# Patient Record
Sex: FEMALE | Race: OTHER | Employment: UNEMPLOYED | URBAN - METROPOLITAN AREA
[De-identification: names, ages, dates, MRNs, and addresses within clinical notes are randomized per-mention and may not be internally consistent; named-entity substitution may affect disease eponyms.]

---

## 2021-04-16 ENCOUNTER — HOSPITAL ENCOUNTER (OUTPATIENT)
Dept: GENERAL RADIOLOGY | Age: 61
Discharge: HOME | End: 2021-04-16
Payer: COMMERCIAL

## 2021-04-16 DIAGNOSIS — J45.909 MILD ASTHMA: ICD-10-CM

## 2021-04-16 PROCEDURE — 71046 X-RAY EXAM CHEST 2 VIEWS: CPT

## 2021-05-14 ENCOUNTER — TELEPHONE (OUTPATIENT)
Dept: CARDIOLOGY CLINIC | Age: 61
End: 2021-05-14

## 2021-05-25 ENCOUNTER — OFFICE VISIT (OUTPATIENT)
Dept: CARDIOLOGY CLINIC | Age: 61
End: 2021-05-25
Payer: COMMERCIAL

## 2021-05-25 VITALS
DIASTOLIC BLOOD PRESSURE: 80 MMHG | HEIGHT: 62 IN | BODY MASS INDEX: 29.63 KG/M2 | OXYGEN SATURATION: 97 % | WEIGHT: 161 LBS | SYSTOLIC BLOOD PRESSURE: 118 MMHG | HEART RATE: 91 BPM

## 2021-05-25 DIAGNOSIS — R06.09 DOE (DYSPNEA ON EXERTION): ICD-10-CM

## 2021-05-25 DIAGNOSIS — R07.9 CHEST PAIN, UNSPECIFIED TYPE: Primary | ICD-10-CM

## 2021-05-25 PROCEDURE — 99204 OFFICE O/P NEW MOD 45 MIN: CPT | Performed by: INTERNAL MEDICINE

## 2021-05-25 PROCEDURE — 93000 ELECTROCARDIOGRAM COMPLETE: CPT | Performed by: INTERNAL MEDICINE

## 2021-05-25 RX ORDER — ALBUTEROL SULFATE 0.63 MG/3ML
0.63 SOLUTION RESPIRATORY (INHALATION)
COMMUNITY

## 2021-05-25 RX ORDER — GUAIFENESIN 100 MG/5ML
LIQUID (ML) ORAL
COMMUNITY
Start: 2021-04-30

## 2021-05-25 RX ORDER — CHOLECALCIFEROL (VITAMIN D3) 125 MCG
CAPSULE ORAL
COMMUNITY

## 2021-05-25 RX ORDER — ZINC GLUCONATE 10 MG
LOZENGE ORAL
COMMUNITY

## 2021-05-25 RX ORDER — ASCORBIC ACID 500 MG
TABLET ORAL
COMMUNITY
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
## 2021-05-25 NOTE — PROGRESS NOTES
Joya Moon presents today for   Chief Complaint   Patient presents with    New Patient     ref by PCP for HEIN and chest pain       Joya Moon preferred language for health care discussion is english/other. Is someone accompanying this pt? yes    Is the patient using any DME equipment during [address]? no    Depression Screening:  3 most recent PHQ Screens 5/25/2021   Little interest or pleasure in doing things Not at all   Feeling down, depressed, irritable, or hopeless Not at all   Total Score PHQ 2 0       Learning Assessment:  Learning Assessment 5/25/2021   PRIMARY LEARNER Patient   BARRIERS PRIMARY LEARNER [address] CAREGIVER Yes   PRIMARY LANGUAGE ENGLISH   PRIMARY LANGUAGE CO-LEARNER [place]   LEARNER PREFERENCE PRIMARY DEMONSTRATION   ANSWERED BY patient   RELATIONSHIP SELF       Abuse Screening:  Abuse Screening Questionnaire 5/25/2021   Do you ever feel afraid of your partner? N   Are you in a relationship with someone who physically or mentally threatens you? N   Is it safe for you to go home? Y       Fall Risk  No flowsheet data found. Pt currently taking Anticoagulant therapy? no    Coordination of Care:  1. Have you been to the ER, urgent care clinic since your last visit? Hospitalized since your last visit? no    2. Have you seen or consulted any other health care providers outside of the [address] [place] [address] since your last visit? Include any pap smears or colon screening.  no

## 2021-07-08 ENCOUNTER — TELEPHONE (OUTPATIENT)
Dept: CARDIOLOGY CLINIC | Age: 61
End: 2021-07-08

## 2021-07-08 NOTE — TELEPHONE ENCOUNTER
----- Message from [id number] Faisal Perez MD sent at 7/7/2021 10:38 AM EDT -----  Please let the patient know that her echocardiogram is normal.  ----- Message -----  From: Lena Villagran  Sent: 7/2/2021  11:03 AM EDT  To: [id number] Faisal Perez MD    Per your last  note\" From cardiac standpoint, her left upper chest and shoulder discomfort is somewhat atypical.  However, she does have risk factors as well as abnormality showing T wave inversions in leads III, V3-V4. With that in mind, I have requested exercise nuclear scan as well as an echocardiogram.  Her blood pressure is well controlled. Her heart rate is acceptable. There is no evidence of decompensated CHF noted; however, she does have orthopnea and requires at least 2 pillows, occasionally 3. An echocardiogram has been requested to assess her left ventricular systolic function and valvular integrity. Her weight today is 161 pounds. Her target LDL is less than 70. She was put on Lipitor in the past by her PCP. However, she could not tolerate and self discontinued. She remains on baby aspirin daily.

## 2021-07-08 NOTE — TELEPHONE ENCOUNTER
----- Message from [id number] Faisal Perez MD sent at 6/29/2021  3:25 PM EDT -----  Please let the patient know that her nuclear scan is unremarkable.  ----- Message -----  From: Cynthia Lopez  Sent: 6/29/2021   3:05 PM EDT  To: [id number] Faisal Perez MD    Per your last note\"  From cardiac standpoint, her left upper chest and shoulder discomfort is somewhat atypical.  However, she does have risk factors as well as abnormality showing T wave inversions in leads III, V3-V4. With that in mind, I have requested exercise nuclear scan as well as an echocardiogram.  Her blood pressure is well controlled. Her heart rate is acceptable. There is no evidence of decompensated CHF noted; however, she does have orthopnea and requires at least 2 pillows, occasionally 3. An echocardiogram has been requested to assess her left ventricular systolic function and valvular integrity. Her weight today is 161 pounds. Her target LDL is less than 70. She was put on Lipitor in the past by her PCP. However, she could not tolerate and self discontinued. She remains on baby aspirin daily. If the nuclear scan or echocardiogram are significantly abnormal, she will likely require further evaluation including possibility of coronary angiography. This was discussed at length with the patient and her  who accompanied her. All questions were answered.

## 2021-12-10 ENCOUNTER — TRANSCRIBE ORDER (OUTPATIENT)
Dept: SCHEDULING | Age: 61
End: 2021-12-10

## 2021-12-10 DIAGNOSIS — Z12.31 VISIT FOR SCREENING MAMMOGRAM: Primary | ICD-10-CM

## 2021-12-21 ENCOUNTER — OFFICE VISIT (OUTPATIENT)
Dept: CARDIOLOGY CLINIC | Age: 61
End: 2021-12-21
Payer: COMMERCIAL

## 2021-12-21 VITALS
DIASTOLIC BLOOD PRESSURE: 86 MMHG | BODY MASS INDEX: 29.26 KG/M2 | WEIGHT: 159 LBS | HEART RATE: 87 BPM | HEIGHT: 62 IN | SYSTOLIC BLOOD PRESSURE: 130 MMHG | OXYGEN SATURATION: 96 %

## 2021-12-21 DIAGNOSIS — R00.2 PALPITATIONS: ICD-10-CM

## 2021-12-21 DIAGNOSIS — R07.9 CHEST PAIN, UNSPECIFIED TYPE: Primary | ICD-10-CM

## 2021-12-21 DIAGNOSIS — R60.9 EDEMA, UNSPECIFIED TYPE: ICD-10-CM

## 2021-12-21 DIAGNOSIS — R06.09 DOE (DYSPNEA ON EXERTION): ICD-10-CM

## 2021-12-21 DIAGNOSIS — I82.4Y9 DEEP VEIN THROMBOSIS (DVT) OF PROXIMAL LOWER EXTREMITY, UNSPECIFIED CHRONICITY, UNSPECIFIED LATERALITY (HCC): ICD-10-CM

## 2021-12-21 PROCEDURE — 99214 OFFICE O/P EST MOD 30 MIN: CPT | Performed by: INTERNAL MEDICINE

## 2021-12-21 PROCEDURE — 93000 ELECTROCARDIOGRAM COMPLETE: CPT | Performed by: INTERNAL MEDICINE

## 2021-12-21 NOTE — PATIENT INSTRUCTIONS
If you have not heard from the central scheduler to schedule your testing in 48 hours, please call [phone].
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
## 2021-12-21 NOTE — PROGRESS NOTES
Vallery Ormond presents today for   Chief Complaint   Patient presents with    Follow-up     6 month follow up        [place] PedroOrmond preferred language for health care discussion is english/other. Is someone accompanying this pt? yes    Is the patient using any DME equipment during [address]? no    Depression Screening:  3 most recent PHQ Screens 12/21/2021   Little interest or pleasure in doing things Not at all   Feeling down, depressed, irritable, or hopeless Not at all   Total Score PHQ 2 0       Learning Assessment:  Learning Assessment 5/25/2021   PRIMARY LEARNER Patient   BARRIERS PRIMARY LEARNER [address] CAREGIVER Yes   PRIMARY LANGUAGE ENGLISH   PRIMARY LANGUAGE [address] [employer]   LEARNER PREFERENCE PRIMARY DEMONSTRATION   ANSWERED BY patient   RELATIONSHIP SELF       Abuse Screening:  Abuse Screening Questionnaire 12/21/2021   Do you ever feel afraid of your partner? N   Are you in a relationship with someone who physically or mentally threatens you? N   Is it safe for you to go home? Y       Fall Risk  No flowsheet data found. Pt currently taking Anticoagulant therapy? no    Coordination of Care:  1. Have you been to the ER, urgent care clinic since your last visit? Hospitalized since your last visit? no    2. Have you seen or consulted any other health care providers outside of the [address] [place] [address] since your last visit? Include any pap smears or colon screening.  no    yes

## 2022-01-05 ENCOUNTER — HOSPITAL ENCOUNTER (OUTPATIENT)
Dept: VASCULAR SURGERY | Age: 62
Discharge: HOME | End: 2022-01-05
Attending: INTERNAL MEDICINE
Payer: COMMERCIAL

## 2022-01-05 ENCOUNTER — HOSPITAL ENCOUNTER (OUTPATIENT)
Dept: MAMMOGRAPHY | Age: 62
Discharge: HOME | End: 2022-01-05
Attending: PHYSICIAN ASSISTANT
Payer: COMMERCIAL

## 2022-01-05 DIAGNOSIS — Z12.31 VISIT FOR SCREENING MAMMOGRAM: ICD-10-CM

## 2022-01-05 DIAGNOSIS — R60.9 EDEMA, UNSPECIFIED TYPE: ICD-10-CM

## 2022-01-05 PROCEDURE — 93970 EXTREMITY STUDY: CPT

## 2022-01-05 PROCEDURE — 77067 SCR MAMMO BI INCL CAD: CPT
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
## 2022-01-25 ENCOUNTER — TELEPHONE (OUTPATIENT)
Dept: CARDIOLOGY CLINIC | Age: 62
End: 2022-01-25

## 2022-01-25 NOTE — LETTER
2/2/2022 2:27 PM    Ms. Claudia Hampton  [id number]        We have been trying to get in contact with you to go over your results. Please give our office a call to go over your results and any recommendations.       Sincerely,        Grisel Anthony MD
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
## 2022-04-05 ENCOUNTER — OFFICE VISIT (OUTPATIENT)
Dept: CARDIOLOGY CLINIC | Age: 62
End: 2022-04-05
Payer: COMMERCIAL

## 2022-04-05 VITALS
DIASTOLIC BLOOD PRESSURE: 82 MMHG | HEIGHT: 62 IN | WEIGHT: 163 LBS | SYSTOLIC BLOOD PRESSURE: 122 MMHG | BODY MASS INDEX: 30 KG/M2 | OXYGEN SATURATION: 98 %

## 2022-04-05 DIAGNOSIS — R07.9 CHEST PAIN, UNSPECIFIED TYPE: Primary | ICD-10-CM

## 2022-04-05 DIAGNOSIS — R06.09 DOE (DYSPNEA ON EXERTION): ICD-10-CM

## 2022-04-05 PROCEDURE — 99214 OFFICE O/P EST MOD 30 MIN: CPT | Performed by: INTERNAL MEDICINE

## 2022-04-05 PROCEDURE — 93000 ELECTROCARDIOGRAM COMPLETE: CPT | Performed by: INTERNAL MEDICINE
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
## 2022-04-05 NOTE — PROGRESS NOTES
Chuy Giang presents today for   Chief Complaint   Patient presents with    Follow-up     3 month follow up     Dizziness     occasionally        Chuy Giang preferred language for health care discussion is english/other. Is someone accompanying this pt? yes    Is the patient using any DME equipment during OV? No     Depression Screening:  3 most recent PHQ Screens 4/5/2022   Little interest or pleasure in doing things Not at all   Feeling down, depressed, irritable, or hopeless Not at all   Total Score PHQ 2 0       Learning Assessment:  Learning Assessment 5/25/2021   PRIMARY LEARNER Patient   BARRIERS PRIMARY LEARNER [address] CAREGIVER Yes   PRIMARY LANGUAGE ENGLISH   PRIMARY LANGUAGE CO-LEARNER [place] Island[place]   LEARNER PREFERENCE PRIMARY DEMONSTRATION   ANSWERED BY patient   RELATIONSHIP SELF       Abuse Screening:  Abuse Screening Questionnaire 4/5/2022   Do you ever feel afraid of your partner? N   Are you in a relationship with someone who physically or mentally threatens you? N   Is it safe for you to go home? Y       Fall Risk  No flowsheet data found. Pt currently taking Anticoagulant therapy? no    Coordination of Care:  1. Have you been to the ER, urgent care clinic since your last visit? Hospitalized since your last visit? no    2. Have you seen or consulted any other health care providers outside of the [address] [place] [address] since your last visit? Include any pap smears or colon screening.  no

## 2024-02-29 ENCOUNTER — OFFICE VISIT (OUTPATIENT)
Facility: CLINIC | Age: 64
End: 2024-02-29
Payer: COMMERCIAL

## 2024-02-29 VITALS
RESPIRATION RATE: 13 BRPM | SYSTOLIC BLOOD PRESSURE: 98 MMHG | WEIGHT: 160 LBS | OXYGEN SATURATION: 92 % | HEIGHT: 62 IN | DIASTOLIC BLOOD PRESSURE: 67 MMHG | BODY MASS INDEX: 29.44 KG/M2 | HEART RATE: 91 BPM | TEMPERATURE: 97.8 F

## 2024-02-29 DIAGNOSIS — J45.20 MILD INTERMITTENT ASTHMA WITHOUT COMPLICATION: ICD-10-CM

## 2024-02-29 DIAGNOSIS — Z00.00 ENCOUNTER FOR ROUTINE ADULT MEDICAL EXAMINATION: ICD-10-CM

## 2024-02-29 DIAGNOSIS — Z76.89 ENCOUNTER TO ESTABLISH CARE: Primary | ICD-10-CM

## 2024-02-29 PROCEDURE — 99203 OFFICE O/P NEW LOW 30 MIN: CPT | Performed by: STUDENT IN AN ORGANIZED HEALTH CARE EDUCATION/TRAINING PROGRAM

## 2024-02-29 SDOH — ECONOMIC STABILITY: FOOD INSECURITY: WITHIN THE PAST 12 MONTHS, YOU WORRIED THAT YOUR FOOD WOULD RUN OUT BEFORE YOU GOT MONEY TO BUY MORE.: NEVER TRUE

## 2024-02-29 SDOH — ECONOMIC STABILITY: FOOD INSECURITY: WITHIN THE PAST 12 MONTHS, THE FOOD YOU BOUGHT JUST DIDN'T LAST AND YOU DIDN'T HAVE MONEY TO GET MORE.: NEVER TRUE

## 2024-02-29 SDOH — ECONOMIC STABILITY: INCOME INSECURITY: HOW HARD IS IT FOR YOU TO PAY FOR THE VERY BASICS LIKE FOOD, HOUSING, MEDICAL CARE, AND HEATING?: NOT HARD AT ALL

## 2024-02-29 SDOH — ECONOMIC STABILITY: HOUSING INSECURITY
IN THE LAST 12 MONTHS, WAS THERE A TIME WHEN YOU DID NOT HAVE A STEADY PLACE TO SLEEP OR SLEPT IN A SHELTER (INCLUDING NOW)?: NO

## 2024-02-29 ASSESSMENT — PATIENT HEALTH QUESTIONNAIRE - PHQ9
2. FEELING DOWN, DEPRESSED OR HOPELESS: 0
SUM OF ALL RESPONSES TO PHQ QUESTIONS 1-9: 0
SUM OF ALL RESPONSES TO PHQ QUESTIONS 1-9: 0
1. LITTLE INTEREST OR PLEASURE IN DOING THINGS: 0
SUM OF ALL RESPONSES TO PHQ QUESTIONS 1-9: 0
SUM OF ALL RESPONSES TO PHQ QUESTIONS 1-9: 0

## 2024-02-29 ASSESSMENT — ENCOUNTER SYMPTOMS
EYE DISCHARGE: 0
ABDOMINAL PAIN: 0
FACIAL SWELLING: 0
COLOR CHANGE: 0
EYE ITCHING: 0
DIARRHEA: 0
CONSTIPATION: 0
VOMITING: 0
EYE REDNESS: 0
SHORTNESS OF BREATH: 0
BACK PAIN: 0
EYE PAIN: 0

## 2024-02-29 NOTE — PROGRESS NOTES
Carmencita Pierre is a 63 y.o. year old female who presents today for   Chief Complaint   Patient presents with    New Patient       Is someone accompanying this pt? Yes    Is the patient using any DME equipment during OV? No     Depression Screenin/29/2024    10:20 AM 2021     3:51 PM   PHQ-9 Questionaire   Little interest or pleasure in doing things 0 0   Feeling down, depressed, or hopeless 0 0   PHQ-9 Total Score 0 0       Abuse Screening:       No data to display                Learning Assessment:  Who is the primary learner? Patient    What is the preferred language for health care of the primary learner? Uz[place]    How does the primary learner prefer to learn new concepts? DEMONSTRATION    How does the primary learner prefer to learn new concepts? VIDEOS    Answered By patient    Relationship to Learner SELF    Highest level of education completed by primary learner? SOME COLLEGE    Are there any barriers / factors that could impact learning? NONE    Will there be a co-learner / caregiver? No        Fall Risk:       No data to display                    Coordination of Care:   1. \"Have you been to the ER, urgent care clinic since your last visit?  Hospitalized since your last visit?\" No     2. \"Have you seen or consulted any other health care providers outside of the [facility] since your last visit?\" Yes     3. For patients aged 45-75: Has the patient had a colonoscopy / FIT/ Cologuard? Not due     If the patient is female:    4. For patients aged 40-74: Has the patient had a mammogram within the past 2 years? Not due     5. For patients aged 21-65: Has the patient had a pap smear? Due     Health Maintenance: reviewed and discussed and ordered per Provider.    Health Maintenance Due   Topic Date Due    HIV screen  Never done    Hepatitis C screen  Never done    DTaP/Tdap/Td vaccine (1 - Tdap) Never done    Cervical cancer screen  Never done    Lipids  Never done    Shingles 
decreased concentration, sleep disturbance and suicidal ideas.              Objective:  Vitals:    02/29/24 1022   BP: 98/67   Site: Left Upper Arm   Position: Sitting   Cuff Size: Large Adult   Pulse: 91   Resp: 13   Temp: 97.8 °F (36.6 °C)   TempSrc: Temporal   SpO2: 92%   Weight: 72.6 kg (160 lb)   Height: 1.575 m (5' 2\")         Physical Exam  Vitals reviewed.   Constitutional:       Appearance: She is normal weight.   HENT:      Head: Normocephalic.      Nose: No congestion or rhinorrhea.   Eyes:      General: No scleral icterus.        Right eye: No discharge.         Left eye: No discharge.   Cardiovascular:      Rate and Rhythm: Normal rate and regular rhythm.   Pulmonary:      Effort: Pulmonary effort is normal.      Breath sounds: Normal breath sounds.   Abdominal:      General: Abdomen is flat.      Palpations: Abdomen is soft.   Musculoskeletal:         General: Normal range of motion.      Cervical back: Normal range of motion and neck supple. No rigidity.   Skin:     General: Skin is warm and dry.   Neurological:      Mental Status: She is alert and oriented to person, place, and time.      Gait: Gait normal.   Psychiatric:         Mood and Affect: Mood normal.         Behavior: Behavior normal.         Thought Content: Thought content normal.         Judgment: Judgment normal.           LABS     TESTS      Assessment/Plan:    1. Encounter to establish care  Will be pt PCP    2. Encounter for routine adult medical examination  - TSH; Future  - CBC; Future  - Comprehensive Metabolic Panel; Future  - Urinalysis; Future  - Lipid Panel; Future  - Hemoglobin A1C; Future    3. Mild intermittent asthma without complication  stable    Lab review: orders written for new lab studies as appropriate; see orders    On this date 2/29/2024 I have spent 30 minutes reviewing previous notes, test results and face to face with the patient discussing the diagnosis and importance of compliance with the treatment plan as

## 2024-03-01 LAB
A/G RATIO: 1.5 RATIO (ref 1.1–2.6)
ALBUMIN SERPL-MCNC: 4.5 G/DL (ref 3.5–5)
ALP BLD-CCNC: 100 U/L (ref 40–120)
ALT SERPL-CCNC: 16 U/L (ref 5–40)
ANION GAP SERPL CALCULATED.3IONS-SCNC: 13 MMOL/L (ref 3–15)
AST SERPL-CCNC: 17 U/L (ref 10–37)
BILIRUB SERPL-MCNC: 0.4 MG/DL (ref 0.2–1.2)
BUN BLDV-MCNC: 14 MG/DL (ref 6–22)
CALCIUM SERPL-MCNC: 9.4 MG/DL (ref 8.4–10.5)
CHLORIDE BLD-SCNC: 103 MMOL/L (ref 98–110)
CHOLESTEROL/HDL RATIO: 3.4 (ref 0–5)
CHOLESTEROL: 191 MG/DL (ref 110–200)
CO2: 27 MMOL/L (ref 20–32)
CREAT SERPL-MCNC: 0.7 MG/DL (ref 0.8–1.4)
ESTIMATED AVERAGE GLUCOSE: 121 MG/DL (ref 91–123)
GLOBULIN: 3 G/DL (ref 2–4)
GLOMERULAR FILTRATION RATE: >60 ML/MIN/1.73 SQ.M.
GLUCOSE: 120 MG/DL (ref 70–99)
HBA1C MFR BLD: 5.9 % (ref 4.8–5.6)
HCT VFR BLD CALC: 46.7 % (ref 35.1–48)
HDLC SERPL-MCNC: 56 MG/DL
HEMOGLOBIN: 14.8 G/DL (ref 11.7–16)
LDL CHOLESTEROL CALCULATED: 93 MG/DL (ref 50–99)
MCH RBC QN AUTO: 29 PG (ref 26–34)
MCHC RBC AUTO-ENTMCNC: 32 G/DL (ref 31–36)
MCV RBC AUTO: 92 FL (ref 80–99)
NON-HDL CHOLESTEROL: 135 MG/DL
PDW BLD-RTO: 14.1 % (ref 10–15.5)
PLATELET # BLD: 249 K/UL (ref 140–440)
PMV BLD AUTO: 12.4 FL (ref 9–13)
POTASSIUM SERPL-SCNC: 4.3 MMOL/L (ref 3.5–5.5)
RBC: 5.1 M/UL (ref 3.8–5.2)
SODIUM BLD-SCNC: 143 MMOL/L (ref 133–145)
TOTAL PROTEIN: 7.5 G/DL (ref 6.2–8.1)
TRIGL SERPL-MCNC: 208 MG/DL (ref 40–149)
TSH SERPL DL<=0.05 MIU/L-ACNC: 1.2 MCU/ML (ref 0.27–4.2)
VLDLC SERPL CALC-MCNC: 42 MG/DL (ref 8–30)
WBC: 6.9 K/UL (ref 4–11)

## 2024-04-16 ENCOUNTER — TELEPHONE (OUTPATIENT)
Facility: CLINIC | Age: 64
End: 2024-04-16

## 2024-04-16 NOTE — TELEPHONE ENCOUNTER
No Patient called to see if provider can discuss her lab results.      Please advise    Thank you

## 2024-05-15 RX ORDER — BUDESONIDE AND FORMOTEROL FUMARATE DIHYDRATE 160; 4.5 UG/1; UG/1
2 AEROSOL RESPIRATORY (INHALATION) 2 TIMES DAILY
Qty: 30.6 G | Refills: 1 | Status: SHIPPED | OUTPATIENT
Start: 2024-05-15

## 2024-08-08 ENCOUNTER — OFFICE VISIT (OUTPATIENT)
Facility: CLINIC | Age: 64
End: 2024-08-08
Payer: COMMERCIAL

## 2024-08-08 VITALS
BODY MASS INDEX: 29.59 KG/M2 | WEIGHT: 160.8 LBS | TEMPERATURE: 98.1 F | RESPIRATION RATE: 12 BRPM | HEIGHT: 62 IN | SYSTOLIC BLOOD PRESSURE: 122 MMHG | OXYGEN SATURATION: 95 % | DIASTOLIC BLOOD PRESSURE: 79 MMHG | HEART RATE: 68 BPM

## 2024-08-08 DIAGNOSIS — R09.89 CHOKING SENSATION: ICD-10-CM

## 2024-08-08 DIAGNOSIS — R22.1 NODULE OF NECK: ICD-10-CM

## 2024-08-08 DIAGNOSIS — H61.21 CERUMEN DEBRIS ON TYMPANIC MEMBRANE OF RIGHT EAR: ICD-10-CM

## 2024-08-08 DIAGNOSIS — R73.03 PREDIABETES: Primary | ICD-10-CM

## 2024-08-08 DIAGNOSIS — H69.93 DISORDER OF BOTH EUSTACHIAN TUBES: ICD-10-CM

## 2024-08-08 LAB — HBA1C MFR BLD: 5.7 %

## 2024-08-08 PROCEDURE — 83036 HEMOGLOBIN GLYCOSYLATED A1C: CPT | Performed by: STUDENT IN AN ORGANIZED HEALTH CARE EDUCATION/TRAINING PROGRAM

## 2024-08-08 PROCEDURE — 99214 OFFICE O/P EST MOD 30 MIN: CPT | Performed by: STUDENT IN AN ORGANIZED HEALTH CARE EDUCATION/TRAINING PROGRAM

## 2024-08-08 RX ORDER — FLUTICASONE PROPIONATE 50 MCG
2 SPRAY, SUSPENSION (ML) NASAL DAILY
Qty: 32 G | Refills: 1 | Status: SHIPPED | OUTPATIENT
Start: 2024-08-08

## 2024-08-08 RX ORDER — ASPIRIN 81 MG/1
81 TABLET, CHEWABLE ORAL DAILY
COMMUNITY

## 2024-08-08 ASSESSMENT — ENCOUNTER SYMPTOMS
COLOR CHANGE: 0
FACIAL SWELLING: 0
SHORTNESS OF BREATH: 0
DIARRHEA: 0
EYE DISCHARGE: 0
EYE PAIN: 0
EYE REDNESS: 0
ABDOMINAL PAIN: 0
CONSTIPATION: 0
BACK PAIN: 0
VOMITING: 0
EYE ITCHING: 0

## 2024-08-08 NOTE — PROGRESS NOTES
Carmencita Pierre is a 63 y.o. year old female who presents today for   Chief Complaint   Patient presents with    Other       Is someone accompanying this pt? Yes     Is the patient using any DME equipment during OV? No     Depression Screenin/29/2024    10:20 AM 2021     3:51 PM   PHQ-9 Questionaire   Little interest or pleasure in doing things 0 0   Feeling down, depressed, or hopeless 0 0   PHQ-9 Total Score 0 0       Abuse Screening:       No data to display                Learning Assessment:  No question data found.    Fall Risk:       No data to display                    Coordination of Care:   1. \"Have you been to the ER, urgent care clinic since your last visit?  Hospitalized since your last visit?\" No     2. \"Have you seen or consulted any other health care providers outside of the [facility] since your last visit?\" No     3. For patients aged 45-75: Has the patient had a colonoscopy / FIT/ Cologuard? Not due     If the patient is female:    4. For patients aged 40-74: Has the patient had a mammogram within the past 2 years? Not due     5. For patients aged 21-65: Has the patient had a pap smear? Due     Health Maintenance: reviewed and discussed and ordered per Provider.    Health Maintenance Due   Topic Date Due    HIV screen  Never done    Hepatitis C screen  Never done    DTaP/Tdap/Td vaccine (1 - Tdap) Never done    Cervical cancer screen  Never done    Shingles vaccine (1 of 2) Never done    Respiratory Syncytial Virus (RSV) Pregnant or age 60 yrs+ (1 - 1-dose 60+ series) Never done    COVID-19 Vaccine (5 - -24 season) 2023    Flu vaccine (1) 2024        -Whit Suarez LPN  [facility]  Phone: [phone]  Fax: [phone]  
reviewing previous notes, test results and face to face with the patient discussing the diagnosis and importance of compliance with the treatment plan as well as documenting on the day of the visit.    I have discussed the diagnosis with the patient and the intended plan as seen in the above orders.  The patient has received an after-visit summary and questions were answered concerning future plans.  I have discussed medication side effects and warnings with the patient as well. I have reviewed the plan of care with the patient, accepted their input and they are in agreement with the treatment goals.     Ella Ross MD

## 2024-09-26 ENCOUNTER — HOSPITAL ENCOUNTER (OUTPATIENT)
Facility: HOSPITAL | Age: 64
Setting detail: RECURRING SERIES
Discharge: HOME | End: 2024-09-29
Payer: COMMERCIAL

## 2024-09-26 PROCEDURE — 97162 PT EVAL MOD COMPLEX 30 MIN: CPT

## 2024-09-26 NOTE — PROGRESS NOTES
Physical Therapy Evaluation      Patient Name: Carmencita Pierre    Date: 2024    : 1960  Insurance: Payor: SAVITA / Plan: SAVITA POS / Product Type: *No Product type* /      Patient  verified yes     Visit #   Current / Total 1 12   Time   In / Out 1200pm 1255pm     If an interpreting service is utilized for treatment of this patient, the contents of this document represent the material reviewed with the patient via the .     TREATMENT AREA =  Other symptoms and signs involving the genitourinary system [R39.89]    SUBJECTIVE  Pain Level (0-10 scale): 0  []constant [x]intermittent []improving []worsening []no change since onset      Any medication changes, allergies to medications, adverse drug reactions, diagnosis change, or new procedure performed?: [x] No    [] Yes (see summary sheet for update)  Subjective functional status/changes:       Other comments/information: Pt reports intermittent back pain. States she tends to have back and pain side pain when constipated     PLOF: functionally independent, no AD,   Limitations to PLOF: constantly seeking out bathrooms when traveling/out of home; self limiting water intake to prevent need for urination; frequent UTIs; constipation   Mechanism of Injury: no know BRANDON  Recent imaging: none    Current symptoms/Complaints:   6-7/10 at worst -- symptoms increased/worsened by: constipation ;  0/10 at best -- symptoms decreased/alleviated by:       Pelvic Specific Pain: states left side abdominal pan and lwo back pain when constipated, no pain with voiding  Current pelvic floor knowledge: very limited    Previous Treatment: none    Past medial Hx: chronic asthma  Past surgical Hx: appendectomy, , left rotator cuff, colonoscopy a year ago, endoscopy     Work Hx:    Living Situation: with spouse  Falls in past year: none    Substance use: []Alcohol []Tobacco []other:   [x]None reported:   Cognition: A & O x 3        Pt Goals: \"Improve

## 2024-09-26 NOTE — PROGRESS NOTES
[facility]  [address] [facility] [address] [place] [address] Ph:[phone] Fx: [phone]    PLAN OF CARE/ Statement of Necessity for Physical Therapy Services           Patient name: Carmencita Pierre Start of Care: 2024   Referral source: Flores Cruz APRN* : 1960    Medical Diagnosis: Other symptoms and signs involving the genitourinary system [R39.89]       Onset Date: 2019   Treatment Diagnosis: R39.89  Other signs and symptoms of genitourinary system                                     Prior Hospitalization: see medical history Provider#: [id number]   Medications: Verified on Patient Summary List     Comorbidities: Other:   Past medial Hx: chronic asthma  Past surgical Hx: appendectomy, , left rotator cuff, colonoscopy a year ago, endoscopy     Prior Level of Function: PLOF: functionally independent, no AD,   Limitations to PLOF: constantly seeking out bathrooms when traveling/out of home; self limiting water intake to prevent need for urination; frequent UTIs; constipation     The Plan of Care and following information is based on the information from the initial evaluation.    Assessment / key information:  Patient is a 64 year old female presenting to Physical Therapy with c/o urinary incontinence and constipation which is limiting ability function at previous level. Pt did report Swedish as primary language. Interpretive services used at [facility]. At end of [facility] pt states she feel confident in her ability to continue therapy sessions without interpretive services and signed waiver to decline services provided to her. Pt reports understanding (following interpretation to Swedish) of her rights for an interpretor, rights to decline, and right to request at any time.      Patient has low back pain complaints when she is constipated. Patient presents with decreased strength, coordination, and endurance of the pelvic floor muscles and

## 2024-10-09 ENCOUNTER — HOSPITAL ENCOUNTER (OUTPATIENT)
Facility: HOSPITAL | Age: 64
Setting detail: RECURRING SERIES
Discharge: HOME | End: 2024-10-12
Payer: COMMERCIAL

## 2024-10-09 PROCEDURE — 97110 THERAPEUTIC EXERCISES: CPT

## 2024-10-09 PROCEDURE — 97535 SELF CARE MNGMENT TRAINING: CPT

## 2024-10-09 PROCEDURE — 97112 NEUROMUSCULAR REEDUCATION: CPT

## 2024-10-16 ENCOUNTER — HOSPITAL ENCOUNTER (OUTPATIENT)
Facility: HOSPITAL | Age: 64
Setting detail: RECURRING SERIES
Discharge: HOME | End: 2024-10-19
Payer: COMMERCIAL

## 2024-10-16 PROCEDURE — 97112 NEUROMUSCULAR REEDUCATION: CPT

## 2024-10-16 PROCEDURE — 97535 SELF CARE MNGMENT TRAINING: CPT

## 2024-10-16 PROCEDURE — 97110 THERAPEUTIC EXERCISES: CPT

## 2024-10-16 NOTE — PROGRESS NOTES
[facility] [place]   11/22/2024  7:50 AM [facility] [place]   2/24/2025  8:00 AM Ella Borrero MD [facility] DEP

## 2024-10-18 ENCOUNTER — HOSPITAL ENCOUNTER (OUTPATIENT)
Facility: HOSPITAL | Age: 64
Setting detail: RECURRING SERIES
Discharge: HOME | End: 2024-10-21
Payer: COMMERCIAL

## 2024-10-18 PROCEDURE — 97535 SELF CARE MNGMENT TRAINING: CPT

## 2024-10-18 PROCEDURE — 97110 THERAPEUTIC EXERCISES: CPT

## 2024-10-18 PROCEDURE — 97112 NEUROMUSCULAR REEDUCATION: CPT

## 2024-10-18 NOTE — PROGRESS NOTES
PHYSICAL / OCCUPATIONAL THERAPY - DAILY TREATMENT NOTE     Patient Name: Carmencita Pierre    Date: 10/18/2024    : 1960  Insurance: Payor: SAVITA / Plan: SAVITA POS / Product Type: *No Product type* /      Patient  verified Yes     Visit #   Current / Total 4 12   Time   In / Out 0750am 0829am   Pain   In / Out 0 0   Subjective Functional Status/Changes: Pt reports she is less sore than she was last time. States she has really noticed things improving and that she has been telling her family how much things have changed.   Changes to:  Allergies, Med Hx, Sx Hx?   no       TREATMENT AREA =  Other symptoms and signs involving the genitourinary system [R39.89]    If an interpreting service is utilized for treatment of this patient, the contents of this document represent the material reviewed with the patient via the .     OBJECTIVE      Therapeutic Procedures:  Tx Min Billable or 1:1 Min (if diff from Tx Min) Procedure, Rationale, Specifics   14  99471 Therapeutic Exercise (timed):  increase ROM, strength, coordination, balance, and proprioception to improve patient's ability to progress to PLOF and address remaining functional goals. (see flow sheet as applicable)    Details if applicable:       15  46846 Neuromuscular Re-Education (timed):  improve balance, coordination, kinesthetic sense, posture, core stability and proprioception to improve patient's ability to develop conscious control of individual muscles and awareness of position of extremities in order to progress to PLOF and address remaining functional goals. (see flow sheet as applicable)    Details if applicable:     10  86506 Self Care/Home Management (timed):  improve patient knowledge and understanding of activity modification  to improve patient's ability to progress to PLOF and address remaining functional goals.  (see flow sheet as applicable)     Details if applicable:  Review of POC. Updated HEP. Discuss symptoms improvements.

## 2024-10-22 ENCOUNTER — APPOINTMENT (OUTPATIENT)
Facility: HOSPITAL | Age: 64
End: 2024-10-22
Payer: COMMERCIAL

## 2024-10-25 ENCOUNTER — HOSPITAL ENCOUNTER (OUTPATIENT)
Facility: HOSPITAL | Age: 64
Setting detail: RECURRING SERIES
Discharge: HOME | End: 2024-10-28
Payer: COMMERCIAL

## 2024-10-25 PROCEDURE — 97110 THERAPEUTIC EXERCISES: CPT

## 2024-10-25 PROCEDURE — 97535 SELF CARE MNGMENT TRAINING: CPT

## 2024-10-25 PROCEDURE — 97112 NEUROMUSCULAR REEDUCATION: CPT

## 2024-10-25 NOTE — PROGRESS NOTES
[facility]  [address] [place] [address] - Ph: [phone]   Fx: [phone]    PHYSICAL THERAPY PROGRESS NOTE      Patient name: Carmencita Pierre Start of Care: 2024   Referral source: Flores Cruz APRN* : 1960               Medical Diagnosis: Other symptoms and signs involving the genitourinary system [R39.89]        Onset Date: 2019   Treatment Diagnosis: R39.89  Other signs and symptoms of genitourinary system                                     Prior Hospitalization: see medical history Provider#: [id number]   Medications: Verified on Patient Summary List      Comorbidities: Other:   Past medial Hx: chronic asthma  Past surgical Hx: appendectomy, , left rotator cuff, colonoscopy a year ago, endoscopy      Prior Level of Function: PLOF: functionally independent, no AD,   Limitations to PLOF: constantly seeking out bathrooms when traveling/out of home; self limiting water intake to prevent need for urination; frequent UTIs; constipation     Reporting Period: 2024-10/25/2025  Visits from Start of Care: 5    Missed Visits: 0    Goals/Measure of Progress: To be achieved in 12 weeks:    Short term goals: To be achieved in 6 treatments::     Pt demonstrates proper dietary/fluid habits & urge suppression strategies that promote bladder & bowel health to aid in management of urinary urgency & incontinence.  Eval: Pt consuming 2 bottles of water a day & unaware of bladder fitness, dietary irritants & urge suppression strategies.   PN 10/25/24: pt states she is drinking three bottles of water a day now     Pt will improved bowel hygiene and positioning on toilet to allow for ability to have bowel movement without straining.   Eval: bowel movements 2 times a week with straining and constant constipation          PN 10/25/24: pt states she is having a bowel movement pretty much every day now and that she is only 
at this time and would continue to benefit from skilled PT to meet remaining goals.     Patient will continue to benefit from skilled PT / OT services to modify and progress therapeutic interventions, analyze and address functional mobility deficits, analyze and address ROM deficits, analyze and address strength deficits, analyze and address soft tissue restrictions, analyze and cue for proper movement patterns, and improve bowel and bladder symptoms  to address functional deficits and attain remaining goals.    Progress toward goals / Updated goals:  []  See Progress Note/Recertification    Short term goals: To be achieved in 6 treatments::     Pt demonstrates proper dietary/fluid habits & urge suppression strategies that promote bladder & bowel health to aid in management of urinary urgency & incontinence.  Eval: Pt consuming 2 bottles of water a day & unaware of bladder fitness, dietary irritants & urge suppression strategies.   PN 10/25/24: pt states she is drinking three bottles of water a day now     Pt will improved bowel hygiene and positioning on toilet to allow for ability to have bowel movement without straining.   Eval: bowel movements 2 times a week with straining and constant constipation     PN 10/25/24: pt states she is having a bowel movement pretty much every day now and that she is only straining one time a week      Pt will demonstrate ability to perform 360 degree diaphragmatic breathing technique for improved down regulation of muscles and promote overall decrease in stress.   Eval: Limited abdominal excursion noted with attempts at 360 degrees diaphragmatic breathing pattern.    PN 10/25/24: continued use of breathing pattern for relaxation and education on use to decrease urgency           Long term goals: To be achieved in 12 treatments::     Pt reports bladder continence 80% of the time with cough/sneeze/laugh & walking to the toilet.   Eval: urinary incontinence requiring pad use and

## 2024-11-01 ENCOUNTER — HOSPITAL ENCOUNTER (OUTPATIENT)
Facility: HOSPITAL | Age: 64
Setting detail: RECURRING SERIES
Discharge: HOME | End: 2024-11-04
Payer: COMMERCIAL

## 2024-11-01 PROCEDURE — 97110 THERAPEUTIC EXERCISES: CPT

## 2024-11-01 PROCEDURE — 97112 NEUROMUSCULAR REEDUCATION: CPT

## 2024-11-01 NOTE — PROGRESS NOTES
demonstrate ability to perform 360 degree diaphragmatic breathing technique for improved down regulation of muscles and promote overall decrease in stress.   Eval: Limited abdominal excursion noted with attempts at 360 degrees diaphragmatic breathing pattern.               PN 10/25/24: continued use of breathing pattern for relaxation and education on use to decrease urgency           Long term goals: To be achieved in 12 treatments::     Pt reports bladder continence 80% of the time with cough/sneeze/laugh & walking to the toilet.   Eval: urinary incontinence requiring pad use and frequent changing of clothes, 3-4 times a day  PN 10/25/24: pt reports she is leaking less than 50% of the time     Pt will report overall increase in functional status and quality of life evidenced by decrease in PFDI- 20 to a score no greater than 140 /300.   Eval: 160.45 / 300  PN 10/25/24: 92.7/300 - MET     Pt demonstrates independence with management tools & exercise program that are beneficial for current condition in order to feel comfortable with Pelvic floor PT D/C & not fear.  Eval: pt fearful of return to exercise & unaware of what activities to avoid to avoid exacerbation of current condition              PN 10/25/24: pt reports compliance with HEP and states she wasn't able to do the palloff press multiple times this week due to left shoulder pain from previous RCR. States she has been using the squatty potty    Current 11/1/24: continue with progression of HEP     Pt will have at least 4+/5 bilateral hip AB strength to return to goals of ADLs without low back pain.  Eval:     L(0-5) R (0-5) N/T Pain with testing   Hip Abduction (assessed in sidleying) 4- 4- []  Pain reported in left with testing of each LE      PN 10/25/24: hip AB in sidelying left 4-/5 with pain reports in left glut, right 4/5 no pain    PLAN  Yes  Continue plan of care  []  Upgrade activities as tolerated  []  Discharge due to :  []  Other:    Christin Armenta

## 2024-11-06 ENCOUNTER — COMMUNITY OUTREACH (OUTPATIENT)
Facility: CLINIC | Age: 64
End: 2024-11-06

## 2024-11-06 NOTE — PROGRESS NOTES
Patient's  shows they are overdue for Colorectal Screening.   Care Everywhere and  files searched.   updated with 2018 Colon Path Report.

## 2024-11-08 ENCOUNTER — HOSPITAL ENCOUNTER (OUTPATIENT)
Facility: HOSPITAL | Age: 64
Setting detail: RECURRING SERIES
Discharge: HOME | End: 2024-11-11
Payer: COMMERCIAL

## 2024-11-08 PROCEDURE — 97110 THERAPEUTIC EXERCISES: CPT

## 2024-11-08 PROCEDURE — 97112 NEUROMUSCULAR REEDUCATION: CPT

## 2024-11-08 NOTE — PROGRESS NOTES
noted with attempts at 360 degrees diaphragmatic breathing pattern.               PN 10/25/24: continued use of breathing pattern for relaxation and education on use to decrease urgency           Long term goals: To be achieved in 12 treatments::     Pt reports bladder continence 80% of the time with cough/sneeze/laugh & walking to the toilet.   Eval: urinary incontinence requiring pad use and frequent changing of clothes, 3-4 times a day  PN 10/25/24: pt reports she is leaking less than 50% of the time     Pt will report overall increase in functional status and quality of life evidenced by decrease in PFDI- 20 to a score no greater than 140 /300.   Eval: 160.45 / 300  PN 10/25/24: 92.7/300 - MET     Pt demonstrates independence with management tools & exercise program that are beneficial for current condition in order to feel comfortable with Pelvic floor PT D/C & not fear.  Eval: pt fearful of return to exercise & unaware of what activities to avoid to avoid exacerbation of current condition              PN 10/25/24: pt reports compliance with HEP and states she wasn't able to do the palloff press multiple times this week due to left shoulder pain from previous RCR. States she has been using the squatty potty               Current 11/1/24: continue with progression of HEP     Pt will have at least 4+/5 bilateral hip AB strength to return to goals of ADLs without low back pain.  Eval:     L(0-5) R (0-5) N/T Pain with testing   Hip Abduction (assessed in sidleying) 4- 4- []  Pain reported in left with testing of each LE      PN 10/25/24: hip AB in sidelying left 4-/5 with pain reports in left glut, right 4/5 no pain  Current 11/8/24: continued with 3 way hip progression for strength     PLAN  Yes  Continue plan of care  []  Upgrade activities as tolerated  []  Discharge due to :  []  Other:    Christin Armenta, PT    11/8/2024    8:00 AM    Future Appointments   Date Time Provider Department Center   11/15/2024  7:50 AM

## 2024-11-15 ENCOUNTER — HOSPITAL ENCOUNTER (OUTPATIENT)
Facility: HOSPITAL | Age: 64
Setting detail: RECURRING SERIES
Discharge: HOME | End: 2024-11-18
Payer: COMMERCIAL

## 2024-11-15 PROCEDURE — 97110 THERAPEUTIC EXERCISES: CPT

## 2024-11-15 PROCEDURE — 97535 SELF CARE MNGMENT TRAINING: CPT

## 2024-11-15 PROCEDURE — 97112 NEUROMUSCULAR REEDUCATION: CPT

## 2024-11-15 NOTE — PROGRESS NOTES
PHYSICAL / OCCUPATIONAL THERAPY - DAILY TREATMENT NOTE     Patient Name: Carmencita Pierre    Date: 11/15/2024    : 1960  Insurance: Payor: SAVITA / Plan: SAVITA POS / Product Type: *No Product type* /      Patient  verified Yes     Visit #   Current / Total 8 12   Time   In / Out 0750am 0831am   Pain   In / Out 0 0   Subjective Functional Status/Changes: Pt states she does the leg lift exercises every day because it hard and then she does some of the others throughout the day. Pt state she knows she does not drink water and is not sure how many time she is supposed to go to the bathroom a day. Pt states she still has to go to the bathroom when she touches something cold.    Changes to:  Allergies, Med Hx, Sx Hx?   no       TREATMENT AREA =  Other symptoms and signs involving the genitourinary system [R39.89]    If an interpreting service is utilized for treatment of this patient, the contents of this document represent the material reviewed with the patient via the .     OBJECTIVE        Therapeutic Procedures:  Tx Min Billable or 1:1 Min (if diff from Tx Min) Procedure, Rationale, Specifics   15  58571 Therapeutic Exercise (timed):  increase ROM, strength, coordination, balance, and proprioception to improve patient's ability to progress to PLOF and address remaining functional goals. (see flow sheet as applicable)    Details if applicable:       18  71163 Neuromuscular Re-Education (timed):  improve balance, coordination, kinesthetic sense, posture, core stability and proprioception to improve patient's ability to develop conscious control of individual muscles and awareness of position of extremities in order to progress to PLOF and address remaining functional goals. (see flow sheet as applicable)    Details if applicable:     8  77418 Self Care/Home Management (timed):  improve patient knowledge and understanding of activity modification  to improve patient's ability to progress to PLOF and

## 2024-11-22 ENCOUNTER — HOSPITAL ENCOUNTER (OUTPATIENT)
Facility: HOSPITAL | Age: 64
Setting detail: RECURRING SERIES
Discharge: HOME | End: 2024-11-25
Payer: COMMERCIAL

## 2024-11-22 PROCEDURE — 97535 SELF CARE MNGMENT TRAINING: CPT

## 2024-11-22 PROCEDURE — 97110 THERAPEUTIC EXERCISES: CPT

## 2024-11-22 PROCEDURE — 97112 NEUROMUSCULAR REEDUCATION: CPT

## 2024-11-22 NOTE — PROGRESS NOTES
[facility]  [address] [place] [address] - Ph: [phone]   Fx: [phone]    PHYSICAL THERAPY PROGRESS NOTE      Patient name: Carmencita Pierre Start of Care: 2024   Referral source: Flores Cruz APRN* : 1960               Medical Diagnosis: Other symptoms and signs involving the genitourinary system [R39.89]        Onset Date: 2019   Treatment Diagnosis: R39.89  Other signs and symptoms of genitourinary system                                     Prior Hospitalization: see medical history Provider#: [id number]   Medications: Verified on Patient Summary List      Comorbidities: Other:   Past medial Hx: chronic asthma  Past surgical Hx: appendectomy, , left rotator cuff, colonoscopy a year ago, endoscopy      Prior Level of Function: PLOF: functionally independent, no AD,   Limitations to PLOF: constantly seeking out bathrooms when traveling/out of home; self limiting water intake to prevent need for urination; frequent UTIs; constipation     Reporting Period: 2024-2024  Visits from Start of Care: 9    Missed Visits: 0    Goals/Measure of Progress: To be achieved in 12 treatments:    Short term goals: To be achieved in 6 treatments::     Pt demonstrates proper dietary/fluid habits & urge suppression strategies that promote bladder & bowel health to aid in management of urinary urgency & incontinence.  Eval: Pt consuming 2 bottles of water a day & unaware of bladder fitness, dietary irritants & urge suppression strategies.   PN 24: pt she is drinking at least three bottles of water a day, has been provided urge suppression techniques      Pt will improved bowel hygiene and positioning on toilet to allow for ability to have bowel movement without straining.   Eval: bowel movements 2 times a week with straining and constant constipation     PN 24: states she is having a bowel movements 3-4 
Pelvic floor PT D/C & not fear.  Eval: pt fearful of return to exercise & unaware of what activities to avoid to avoid ex   PN 11/22/24: continued to progress HEP and discuss urge suppression techniques for urinary incontinence      Pt will have at least 4+/5 bilateral hip AB strength to return to goals of ADLs without low back pain.  Eval:     L(0-5) R (0-5) N/T Pain with testing   Hip Abduction (assessed in sidleying) 4- 4- []  Pain reported in left with testing of each LE      PN 11/22/24: hip AB sidelying left 4/5, right 4+/5 - PROGRESSING    PLAN  Yes  Continue plan of care  []  Upgrade activities as tolerated  []  Discharge due to :  []  Other:    Christin Armenta PT    11/22/2024    7:51 AM    Future Appointments   Date Time Provider Department Center   11/29/2024  8:30 AM Christin Arment[facility]   12/6/2024  9:50 AM Christin Armenta PT [facility]   2/24/2025  8:00 AM Ella Borrero MD [facility]

## 2024-11-29 ENCOUNTER — HOSPITAL ENCOUNTER (OUTPATIENT)
Facility: HOSPITAL | Age: 64
Setting detail: RECURRING SERIES
Discharge: HOME | End: 2024-12-02
Payer: COMMERCIAL

## 2024-11-29 PROCEDURE — 97110 THERAPEUTIC EXERCISES: CPT

## 2024-11-29 PROCEDURE — 97112 NEUROMUSCULAR REEDUCATION: CPT

## 2024-11-29 NOTE — PROGRESS NOTES
PHYSICAL / OCCUPATIONAL THERAPY - DAILY TREATMENT NOTE     Patient Name: Carmencita Pierre    Date: 2024    : 1960  Insurance: Payor: SAVITA / Plan: ROBERT[employer] POS / Product Type: *No Product type* /      Patient  verified Yes     Visit #   Current / Total 10 12   Time   In / Out 830am 907am   Pain   In / Out 0 0   Subjective Functional Status/Changes: Pt states she is doing well. State she has been sick with a cough over the past week.    Changes to:  Allergies, Med Hx, Sx Hx?   no       TREATMENT AREA =  Other symptoms and signs involving the genitourinary system [R39.89]    If an interpreting service is utilized for treatment of this patient, the contents of this document represent the material reviewed with the patient via the .     OBJECTIVE        Therapeutic Procedures:  Tx Min Billable or 1:1 Min (if diff from Tx Min) Procedure, Rationale, Specifics   14  81800 Therapeutic Exercise (timed):  increase ROM, strength, coordination, balance, and proprioception to improve patient's ability to progress to PLOF and address remaining functional goals. (see flow sheet as applicable)    Details if applicable:       23  98786 Neuromuscular Re-Education (timed):  improve balance, coordination, kinesthetic sense, posture, core stability and proprioception to improve patient's ability to develop conscious control of individual muscles and awareness of position of extremities in order to progress to PLOF and address remaining functional goals. (see flow sheet as applicable)    Details if applicable:            Details if applicable:           Details if applicable:            Details if applicable:     37  [facility] Totals Reminder: bill using total billable min of TIMED therapeutic procedures (example: do not include dry needle or estim unattended, both untimed codes, in totals to left)  8-22 min = 1 unit; 23-37 min = 2 units; 38-52 min = 3 units; 53-67 min = 4 units; 68-82 min = 5 units   Total Total  diaphragmatic breathing technique for improved down regulation of muscles and promote overall decrease in stress.   Eval: Limited abdominal excursion noted with attempts at 360 degrees diaphragmatic breathing pattern.               PN 11/22/24: states she is trying to use the breathing for urge suppression but does not have a lot of time           Long term goals: To be achieved in 12 treatments::     Pt reports bladder continence 80% of the time with cough/sneeze/laugh & walking to the toilet.   Eval: urinary incontinence requiring pad use and frequent changing of clothes, 3-4 times a day  PN 11/22/24: states she is not leaking with sneezing and coughing but still has strong urge when touching cold water and \"has to get there right then.\"   Current 11/29/24: pt reports increased leaking over past few days due to asthma and increased coughing, pt reports understanding of education on increased coughing relationship to increased leaking due to muscle fatigue     Pt will report overall increase in functional status and quality of life evidenced by decrease in PFDI- 20 to a score no greater than 140 /300.   Eval: 160.45 / 300  PN 10/25/24: 92.7/300 - MET     Pt demonstrates independence with management tools & exercise program that are beneficial for current condition in order to feel comfortable with Pelvic floor PT D/C & not fear.  Eval: pt fearful of return to exercise & unaware of what activities to avoid to avoid ex              PN 11/22/24: continued to progress HEP and discuss urge suppression techniques for urinary incontinence      Pt will have at least 4+/5 bilateral hip AB strength to return to goals of ADLs without low back pain.  Eval:     L(0-5) R (0-5) N/T Pain with testing   [facility] 4- 4- []  Pain reported in left with testing of each LE      PN 11/22/24: hip AB sidelying left 4/5, right 4+/5 - PROGRESSING    PLAN  Yes  Continue plan of care  []  Upgrade activities as

## 2024-12-05 ENCOUNTER — TELEPHONE (OUTPATIENT)
Facility: HOSPITAL | Age: 64
End: 2024-12-05

## 2024-12-11 ENCOUNTER — TELEPHONE (OUTPATIENT)
Facility: HOSPITAL | Age: 64
End: 2024-12-11

## 2024-12-20 ENCOUNTER — TELEPHONE (OUTPATIENT)
Facility: HOSPITAL | Age: 64
End: 2024-12-20

## 2025-07-09 ENCOUNTER — HOSPITAL ENCOUNTER (OUTPATIENT)
Facility: HOSPITAL | Age: 65
Setting detail: SPECIMEN
Discharge: HOME | End: 2025-07-12

## 2025-07-09 ENCOUNTER — OFFICE VISIT (OUTPATIENT)
Facility: CLINIC | Age: 65
End: 2025-07-09
Payer: COMMERCIAL

## 2025-07-09 VITALS
TEMPERATURE: 97.6 F | HEIGHT: 62 IN | SYSTOLIC BLOOD PRESSURE: 130 MMHG | HEART RATE: 90 BPM | BODY MASS INDEX: 28.71 KG/M2 | DIASTOLIC BLOOD PRESSURE: 82 MMHG | WEIGHT: 156 LBS | RESPIRATION RATE: 15 BRPM | OXYGEN SATURATION: 93 %

## 2025-07-09 DIAGNOSIS — H02.834 DERMATOCHALASIS OF BOTH UPPER EYELIDS: ICD-10-CM

## 2025-07-09 DIAGNOSIS — R20.2 PARESTHESIA: Primary | ICD-10-CM

## 2025-07-09 DIAGNOSIS — H02.831 DERMATOCHALASIS OF BOTH UPPER EYELIDS: ICD-10-CM

## 2025-07-09 DIAGNOSIS — Z90.49 HISTORY OF LAPAROSCOPIC CHOLECYSTECTOMY: ICD-10-CM

## 2025-07-09 DIAGNOSIS — Z00.00 ENCOUNTER FOR ROUTINE ADULT MEDICAL EXAMINATION: ICD-10-CM

## 2025-07-09 LAB
BACTERIA, URINE: NEGATIVE
BILIRUBIN, URINE: NEGATIVE
CLARITY, UA: CLEAR
COLOR, UA: YELLOW
GLUCOSE URINE: NEGATIVE MG/DL
HYALINE CASTS: ABNORMAL /LPF (ref 0–2)
KETONES, URINE: NEGATIVE MG/DL
LEUKOCYTE ESTERASE, URINE: ABNORMAL
NITRITE, URINE: NEGATIVE
OCCULT BLOOD,URINE: ABNORMAL
PH, URINE: 6 PH (ref 5–8)
PROTEIN, URINE: NEGATIVE MG/DL
RBC URINE: ABNORMAL /HPF
SENTARA SPECIMEN COLLECTION: NORMAL
SPECIFIC GRAVITY UA: 1.02 (ref 1–1.03)
SQUAMOUS EPITHELIAL CELLS: ABNORMAL /HPF
UROBILINOGEN, URINE: 0.2 MG/DL
WBC URINE: ABNORMAL /HPF (ref 0–5)

## 2025-07-09 PROCEDURE — 99214 OFFICE O/P EST MOD 30 MIN: CPT | Performed by: STUDENT IN AN ORGANIZED HEALTH CARE EDUCATION/TRAINING PROGRAM

## 2025-07-09 PROCEDURE — 99001 SPECIMEN HANDLING PT-LAB: CPT

## 2025-07-09 PROCEDURE — 99396 PREV VISIT EST AGE 40-64: CPT | Performed by: STUDENT IN AN ORGANIZED HEALTH CARE EDUCATION/TRAINING PROGRAM

## 2025-07-09 RX ORDER — LIDOCAINE 50 MG/G
OINTMENT TOPICAL
Qty: 35 G | Refills: 1 | Status: SHIPPED | OUTPATIENT
Start: 2025-07-09 | End: 2025-07-10 | Stop reason: SDUPTHER

## 2025-07-09 SDOH — ECONOMIC STABILITY: FOOD INSECURITY: WITHIN THE PAST 12 MONTHS, THE FOOD YOU BOUGHT JUST DIDN'T LAST AND YOU DIDN'T HAVE MONEY TO GET MORE.: NEVER TRUE

## 2025-07-09 SDOH — ECONOMIC STABILITY: FOOD INSECURITY: WITHIN THE PAST 12 MONTHS, YOU WORRIED THAT YOUR FOOD WOULD RUN OUT BEFORE YOU GOT MONEY TO BUY MORE.: NEVER TRUE

## 2025-07-09 ASSESSMENT — ENCOUNTER SYMPTOMS
VOMITING: 0
EYE PAIN: 0
SHORTNESS OF BREATH: 0
ABDOMINAL PAIN: 0
EYE DISCHARGE: 0
EYE REDNESS: 0
EYE ITCHING: 0
BACK PAIN: 0
FACIAL SWELLING: 0
DIARRHEA: 0
CONSTIPATION: 0
COLOR CHANGE: 0

## 2025-07-09 NOTE — PROGRESS NOTES
Carmencita Pierre is a 64 y.o. year old female who presents today for   Chief Complaint   Patient presents with    Annual Exam        \"Have you been to the ER, urgent care clinic since your last visit?  Hospitalized since your last visit?\"   01/14/2025     “Have you seen or consulted any other health care providers outside our system since your last visit?”   NO     Have you had a mammogram?”   NO    Date of last Mammogram: 6/15/2024      “Have you had a pap smear?”    NO    No cervical cancer screening on file             - GIO Mccormick  [facility]  Phone: [phone]  Fax: [phone]  
Behavior: Behavior normal.         Thought Content: Thought content normal.         Judgment: Judgment normal.         LABS     TESTS      Assessment/Plan:    1. Paresthesia  Likely a nerve issue 2/2 surgery vs small keloid. Will try lidocaine and covering the incision site bc the rubbing of the incision could also be the problem.  - lidocaine (XYLOCAINE) 5 % ointment; Apply topically as needed.  Dispense: 35 g; Refill: 1    2. History of laparoscopic cholecystectomy    3. Encounter for routine adult medical examination  - TSH; Future  - CBC; Future  - Comprehensive Metabolic Panel; Future  - Urinalysis; Future  - Lipid Panel; Future  - Hemoglobin A1C; Future      4. Dermatochalasis of both upper eyelids  - External Referral To Ophthalmology      Lab review: orders written for new lab studies as appropriate; see orders    On this date 7/9/2025 I have spent 30 minutes reviewing previous notes, test results and face to face with the patient discussing the diagnosis and importance of compliance with the treatment plan as well as documenting on the day of the visit.    I have discussed the diagnosis with the patient and the intended plan as seen in the above orders.  The patient has received an after-visit summary and questions were answered concerning future plans.  I have discussed medication side effects and warnings with the patient as well. I have reviewed the plan of care with the patient, accepted their input and they are in agreement with the treatment goals.     lEla Ross MD

## 2025-07-10 DIAGNOSIS — R20.2 PARESTHESIA: ICD-10-CM

## 2025-07-10 LAB
A/G RATIO: 1.7 RATIO (ref 1.1–2.6)
ALBUMIN: 4.5 G/DL (ref 3.5–5)
ALP BLD-CCNC: 102 U/L (ref 40–120)
ALT SERPL-CCNC: 13 U/L (ref 5–40)
ANION GAP SERPL CALCULATED.3IONS-SCNC: 13 MMOL/L (ref 3–15)
AST SERPL-CCNC: 15 U/L (ref 10–37)
BILIRUB SERPL-MCNC: 0.4 MG/DL (ref 0.2–1.2)
BUN BLDV-MCNC: 21 MG/DL (ref 6–22)
CALCIUM SERPL-MCNC: 9.5 MG/DL (ref 8.4–10.5)
CHLORIDE BLD-SCNC: 103 MMOL/L (ref 98–110)
CHOLESTEROL, TOTAL: 201 MG/DL (ref 110–200)
CHOLESTEROL/HDL RATIO: 3.9 (ref 0–5)
CO2: 27 MMOL/L (ref 20–32)
CREAT SERPL-MCNC: 0.6 MG/DL (ref 0.8–1.4)
ESTIMATED AVERAGE GLUCOSE: 118 MG/DL (ref 91–123)
GFR, ESTIMATED: >90 ML/MIN/1.73 SQ.M.
GLOBULIN: 2.6 G/DL (ref 2–4)
GLUCOSE: 96 MG/DL (ref 70–99)
HBA1C MFR BLD: 5.8 % (ref 4.8–5.6)
HCT VFR BLD CALC: 43.8 % (ref 35.1–48)
HDLC SERPL-MCNC: 52 MG/DL
HEMOGLOBIN: 13.9 G/DL (ref 11.7–16)
LDL CHOLESTEROL: 111 MG/DL (ref 50–99)
LDL/HDL RATIO: 2.1
MCH RBC QN AUTO: 29 PG (ref 26–34)
MCHC RBC AUTO-ENTMCNC: 32 G/DL (ref 31–36)
MCV RBC AUTO: 91 FL (ref 80–99)
NON-HDL CHOLESTEROL: 149 MG/DL
PDW BLD-RTO: 13.6 % (ref 10–15.5)
PLATELET # BLD: 229 K/UL (ref 140–440)
PMV BLD AUTO: 12.1 FL (ref 9–13)
POTASSIUM SERPL-SCNC: 4.2 MMOL/L (ref 3.5–5.5)
RBC # BLD: 4.8 M/UL (ref 3.8–5.2)
SODIUM BLD-SCNC: 143 MMOL/L (ref 133–145)
TOTAL PROTEIN: 7.1 G/DL (ref 6.2–8.1)
TRIGL SERPL-MCNC: 188 MG/DL (ref 40–149)
TSH SERPL DL<=0.05 MIU/L-ACNC: 1.92 MCU/ML (ref 0.27–4.2)
VLDLC SERPL CALC-MCNC: 38 MG/DL (ref 8–30)
WBC # BLD: 6.8 K/UL (ref 4–11)

## 2025-07-10 NOTE — TELEPHONE ENCOUNTER
Refill for lidocaine was rejected by the pharmacy because the quantity was not indicated. Pt requested that you call back or refax the order with the quantity indicated.

## 2025-07-11 RX ORDER — LIDOCAINE 50 MG/G
OINTMENT TOPICAL
Qty: 35 G | Refills: 1 | Status: SHIPPED | OUTPATIENT
Start: 2025-07-11 | End: 2025-07-12

## 2025-07-11 NOTE — TELEPHONE ENCOUNTER
Medication(s) requesting:   Requested Prescriptions     Pending Prescriptions Disp Refills    lidocaine (XYLOCAINE) 5 % ointment 35 g 1     Sig: Apply topically as needed.        Last Appointment:  7/9/2025    Future Appointments   Date Time Provider Department Center   10/17/2025  9:00 AM Ella Borrero MD [facility] DE[facility]

## 2025-07-12 DIAGNOSIS — R20.2 PARESTHESIA: ICD-10-CM

## 2025-07-12 RX ORDER — LIDOCAINE 50 MG/G
OINTMENT TOPICAL
Qty: 36 G | Refills: 1 | Status: SHIPPED | OUTPATIENT
Start: 2025-07-12

## 2025-08-05 ENCOUNTER — TELEPHONE (OUTPATIENT)
Facility: CLINIC | Age: 65
End: 2025-08-05